# Patient Record
Sex: FEMALE | Race: WHITE | NOT HISPANIC OR LATINO | URBAN - METROPOLITAN AREA
[De-identification: names, ages, dates, MRNs, and addresses within clinical notes are randomized per-mention and may not be internally consistent; named-entity substitution may affect disease eponyms.]

---

## 2017-01-04 ENCOUNTER — ALLSCRIPTS OFFICE VISIT (OUTPATIENT)
Dept: OTHER | Facility: OTHER | Age: 22
End: 2017-01-04

## 2017-01-06 ENCOUNTER — LAB CONVERSION - ENCOUNTER (OUTPATIENT)
Dept: OTHER | Facility: OTHER | Age: 22
End: 2017-01-06

## 2017-01-06 ENCOUNTER — GENERIC CONVERSION - ENCOUNTER (OUTPATIENT)
Dept: OTHER | Facility: OTHER | Age: 22
End: 2017-01-06

## 2017-01-06 LAB
CHLAMYDIA TRACHOMATIS BY MOL. METHOD (HISTORICAL): NOT DETECTED
GC BY MOL. METHOD (HISTORICAL): NOT DETECTED
PAP (HISTORICAL): NORMAL

## 2018-01-05 ENCOUNTER — ALLSCRIPTS OFFICE VISIT (OUTPATIENT)
Dept: OTHER | Facility: OTHER | Age: 23
End: 2018-01-05

## 2018-01-13 VITALS
SYSTOLIC BLOOD PRESSURE: 128 MMHG | BODY MASS INDEX: 22.36 KG/M2 | DIASTOLIC BLOOD PRESSURE: 70 MMHG | WEIGHT: 131 LBS | HEIGHT: 64 IN

## 2018-01-18 NOTE — RESULT NOTES
Verified Results  (B) PAP TEST ONLY [id number] 09:36AM [facility]     Test Name Result Flag Reference   PAP, LIQUID-BASED NILM     DIAGNOSIS:            Negative for intraepithelial lesion or malignancy  ADEQUACY:             Satisfactory for evaluation /                         Endocervical/transformation zone component                         present  COMMENT:              This Pap smear was screened with the assistance                         of the NovitazPrep(TM) Imaging System and                         screened by a cytotechnologist   SPECIMEN SOURCE:      PAP, LIQUID-BASED, CERVIX  CLINICAL INFORMATION: LMP: 12/13/2016                        Provided Diagnosis Codes: Z01 419,Z11 3                                                Cervicovaginal cytology should be considered a                         screening procedure subject to false negatives                         and false positives  Results are more reliable                         when a satisfactory sample is obtained on a                         regular repetitive basis, and should be                         interpreted together with past and current                         clinical data  ELECTRONICALLY SIGNED   BY:                   Screened By: MARIAN Rodgers (ASCP)   Case                         Electronically Signed 01/05/2017     (B) GONORRHEA AND CHLAMYDIA BY MULTIPLEX PCR [id number] 09:36AM [facility]de     Test Name Result Flag Reference   Chlamyd  Trach by [place] Not Detected     GC BY MULTIPLEX PCR Not Detected     CHLAMYDIA TRACHOMATIS BY MULTIPLEX PCR (1,2,3)  GC BY MULTIPLEX PCR (1,2,3)  (1)  This test is an in vitro test for the detection of sexually transmitted   infections (STIs) in clinical specimens  The test utilizes   amplification of target DNA by the Polymerase Chain Reaction (PCR)   based on dual priming oligonucleotide technology and detects STI DNA    (2)  This test was evaluated and its performance characteristics determined   by [facility]  It has not been cleared or approved by   the [place]  Food and Drug Administration  The FDA has determined that   such clearance or approval is not necessary  [facility]   is certified under the Clinical Laboratory Improvement Act of 1988   (CLIA) as qualified to perform high complexity clinical testing   (3)  Results should be interpreted together with past and current clinical   and laboratory data

## 2018-01-23 VITALS
WEIGHT: 139 LBS | HEIGHT: 64 IN | SYSTOLIC BLOOD PRESSURE: 120 MMHG | DIASTOLIC BLOOD PRESSURE: 78 MMHG | BODY MASS INDEX: 23.73 KG/M2

## 2018-01-26 DIAGNOSIS — Z30.09 BIRTH CONTROL COUNSELING: Primary | ICD-10-CM

## 2018-01-30 ENCOUNTER — TELEPHONE (OUTPATIENT)
Dept: OBGYN CLINIC | Facility: CLINIC | Age: 23
End: 2018-01-30

## 2018-01-30 DIAGNOSIS — Z30.09 BIRTH CONTROL COUNSELING: Primary | ICD-10-CM

## 2018-01-30 DIAGNOSIS — Z30.09 BIRTH CONTROL COUNSELING: ICD-10-CM

## 2018-01-30 NOTE — TELEPHONE ENCOUNTER
I left message for patient that rx is at pharmacy, I routed this to geremias mckinney but it was done by dr Shikha Fonseca

## 2018-01-30 NOTE — TELEPHONE ENCOUNTER
Pt's mother is calling regarding rx for birth ctl - pt states it is not at the pharmacy and she was seen 1/5   [facility]

## 2018-01-31 ENCOUNTER — TELEPHONE (OUTPATIENT)
Dept: OBGYN CLINIC | Facility: CLINIC | Age: 23
End: 2018-01-31

## 2018-01-31 NOTE — TELEPHONE ENCOUNTER
Pt calling to state [facility] has NOT been [facility] [place],  [facility],  pls resend to [facility] [employer], [address]

## 2018-01-31 NOTE — TELEPHONE ENCOUNTER
Has the oral contraceptive trilegest fe been sent to the correct pharmacy at [facility]

## 2019-01-04 DIAGNOSIS — IMO0001 BIRTH CONTROL: Primary | ICD-10-CM
